# Patient Record
Sex: FEMALE | Race: WHITE | NOT HISPANIC OR LATINO | URBAN - METROPOLITAN AREA
[De-identification: names, ages, dates, MRNs, and addresses within clinical notes are randomized per-mention and may not be internally consistent; named-entity substitution may affect disease eponyms.]

---

## 2023-05-29 LAB — SARS-COV-2 RESULT: NOT DETECTED

## 2024-02-06 ENCOUNTER — OFFICE VISIT (OUTPATIENT)
Dept: URGENT CARE | Facility: CLINIC | Age: 49
End: 2024-02-06
Payer: COMMERCIAL

## 2024-02-06 VITALS
TEMPERATURE: 97.3 F | OXYGEN SATURATION: 92 % | DIASTOLIC BLOOD PRESSURE: 84 MMHG | SYSTOLIC BLOOD PRESSURE: 122 MMHG | RESPIRATION RATE: 18 BRPM | HEART RATE: 99 BPM

## 2024-02-06 DIAGNOSIS — J44.1 COPD WITH ACUTE EXACERBATION (MULTI): Primary | ICD-10-CM

## 2024-02-06 PROCEDURE — 99214 OFFICE O/P EST MOD 30 MIN: CPT | Performed by: PHYSICIAN ASSISTANT

## 2024-02-06 RX ORDER — ALBUTEROL SULFATE 90 UG/1
2 AEROSOL, METERED RESPIRATORY (INHALATION) EVERY 6 HOURS PRN
Qty: 18 G | Refills: 0 | Status: SHIPPED | OUTPATIENT
Start: 2024-02-06 | End: 2025-02-05

## 2024-02-06 RX ORDER — DOXYCYCLINE 100 MG/1
100 CAPSULE ORAL 2 TIMES DAILY
Qty: 20 CAPSULE | Refills: 0 | Status: SHIPPED | OUTPATIENT
Start: 2024-02-06 | End: 2024-02-16

## 2024-02-06 RX ORDER — ALBUTEROL SULFATE 0.83 MG/ML
2.5 SOLUTION RESPIRATORY (INHALATION) EVERY 6 HOURS PRN
COMMUNITY
Start: 2021-12-27

## 2024-02-06 RX ORDER — ALBUTEROL SULFATE 90 UG/1
AEROSOL, METERED RESPIRATORY (INHALATION)
COMMUNITY

## 2024-02-06 RX ORDER — PREDNISONE 20 MG/1
40 TABLET ORAL DAILY
Qty: 10 TABLET | Refills: 0 | Status: SHIPPED | OUTPATIENT
Start: 2024-02-06 | End: 2024-02-11

## 2024-02-06 RX ORDER — ALBUTEROL SULFATE 0.83 MG/ML
2.5 SOLUTION RESPIRATORY (INHALATION) EVERY 4 HOURS PRN
Qty: 75 ML | Refills: 0 | Status: SHIPPED | OUTPATIENT
Start: 2024-02-06 | End: 2025-02-05

## 2024-02-06 ASSESSMENT — ENCOUNTER SYMPTOMS
COUGH: 1
ENDOCRINE NEGATIVE: 1
SORE THROAT: 0
SHORTNESS OF BREATH: 1
GASTROINTESTINAL NEGATIVE: 1
FEVER: 0
PSYCHIATRIC NEGATIVE: 1
ALLERGIC/IMMUNOLOGIC NEGATIVE: 1
NEUROLOGICAL NEGATIVE: 1
MUSCULOSKELETAL NEGATIVE: 1
HEMATOLOGIC/LYMPHATIC NEGATIVE: 1
EYES NEGATIVE: 1

## 2024-02-06 NOTE — PROGRESS NOTES
Subjective   Patient ID: Sylvia Downs is a 48 y.o. female.      History provided by:  Patient   used: No    Shortness of Breath  Associated symptoms: cough    Associated symptoms: no ear pain, no fever and no sore throat      This is a 48 yr old female here for respiratory sxs. Cough productive of yellow sputum, and dyspnea x 1.5 weeks. No fever, URI sxs, ear pain, sore throat or edema, hx of COPD, out of inhaler and nebulizer rx.    Review of Systems   Constitutional:  Negative for fever.   HENT:  Negative for congestion, ear pain and sore throat.    Eyes: Negative.    Respiratory:  Positive for cough and shortness of breath.    Cardiovascular:  Negative for leg swelling.   Gastrointestinal: Negative.    Endocrine: Negative.    Genitourinary: Negative.    Musculoskeletal: Negative.    Skin: Negative.    Allergic/Immunologic: Negative.    Neurological: Negative.    Hematological: Negative.    Psychiatric/Behavioral: Negative.     All other systems reviewed and are negative.  /84   Pulse 99   Temp 36.3 °C (97.3 °F)   Resp 18   SpO2 92%     Objective   Physical Exam  Vitals and nursing note reviewed.   Constitutional:       Appearance: Normal appearance.   HENT:      Head: Normocephalic and atraumatic.      Right Ear: Tympanic membrane and ear canal normal.      Left Ear: Tympanic membrane and ear canal normal.      Mouth/Throat:      Mouth: Mucous membranes are moist.      Pharynx: Oropharynx is clear.   Cardiovascular:      Rate and Rhythm: Normal rate and regular rhythm.   Pulmonary:      Effort: Pulmonary effort is normal.      Breath sounds: Wheezing and rhonchi present.   Musculoskeletal:      Cervical back: Neck supple.      Right lower leg: No edema.      Left lower leg: No edema.   Lymphadenopathy:      Cervical: No cervical adenopathy.   Skin:     General: Skin is warm and dry.   Neurological:      General: No focal deficit present.      Mental Status: She is alert and oriented  to person, place, and time.   Psychiatric:         Mood and Affect: Mood normal.         Behavior: Behavior normal.     Assessment:  COPD exacerbation     Plan:  Doxycycline 100 mg bid x 10 days  Prednisone 40 mg daily x 5 days  Refill albuterol MDI and albuterol neb soln as directed  Increase clear fluids  Pcp follow up this week if not improving or worsening  ER visit anytime 24/7 for acute worsening or changing condition

## 2024-02-06 NOTE — PATIENT INSTRUCTIONS
Pcp follow up this week if not improving or worsening  ER visit anytime 24/7 for acute worsening or changing condition

## 2024-04-19 ENCOUNTER — HOSPITAL ENCOUNTER (OUTPATIENT)
Dept: RADIOLOGY | Facility: CLINIC | Age: 49
Discharge: HOME | End: 2024-04-19
Payer: COMMERCIAL

## 2024-04-19 ENCOUNTER — OFFICE VISIT (OUTPATIENT)
Dept: URGENT CARE | Facility: CLINIC | Age: 49
End: 2024-04-19
Payer: COMMERCIAL

## 2024-04-19 VITALS
RESPIRATION RATE: 22 BRPM | OXYGEN SATURATION: 95 % | TEMPERATURE: 98 F | SYSTOLIC BLOOD PRESSURE: 133 MMHG | DIASTOLIC BLOOD PRESSURE: 89 MMHG | HEART RATE: 109 BPM

## 2024-04-19 DIAGNOSIS — J44.1 COPD EXACERBATION (MULTI): ICD-10-CM

## 2024-04-19 DIAGNOSIS — J44.1 COPD EXACERBATION (MULTI): Primary | ICD-10-CM

## 2024-04-19 LAB
POC TRIPLEX FLU A-AG: NORMAL
POC TRIPLEX FLU B-AG: NORMAL
POC TRIPLEX SARSCOV-2 AG: NORMAL

## 2024-04-19 PROCEDURE — 87428 SARSCOV & INF VIR A&B AG IA: CPT | Performed by: PHYSICIAN ASSISTANT

## 2024-04-19 PROCEDURE — 94640 AIRWAY INHALATION TREATMENT: CPT | Performed by: PHYSICIAN ASSISTANT

## 2024-04-19 PROCEDURE — 71046 X-RAY EXAM CHEST 2 VIEWS: CPT | Performed by: RADIOLOGY

## 2024-04-19 PROCEDURE — 71046 X-RAY EXAM CHEST 2 VIEWS: CPT

## 2024-04-19 PROCEDURE — 99214 OFFICE O/P EST MOD 30 MIN: CPT | Performed by: PHYSICIAN ASSISTANT

## 2024-04-19 RX ORDER — AZITHROMYCIN 250 MG/1
TABLET, FILM COATED ORAL
Qty: 6 TABLET | Refills: 0 | Status: SHIPPED | OUTPATIENT
Start: 2024-04-19 | End: 2024-04-24

## 2024-04-19 RX ORDER — IPRATROPIUM BROMIDE AND ALBUTEROL SULFATE 2.5; .5 MG/3ML; MG/3ML
3 SOLUTION RESPIRATORY (INHALATION) ONCE
Status: COMPLETED | OUTPATIENT
Start: 2024-04-19 | End: 2024-04-19

## 2024-04-19 RX ORDER — PREDNISONE 10 MG/1
50 TABLET ORAL DAILY
Qty: 25 TABLET | Refills: 0 | Status: SHIPPED | OUTPATIENT
Start: 2024-04-19 | End: 2024-04-24

## 2024-04-19 RX ORDER — FLUTICASONE PROPIONATE 110 UG/1
1 AEROSOL, METERED RESPIRATORY (INHALATION)
Qty: 12 G | Refills: 0 | Status: SHIPPED | OUTPATIENT
Start: 2024-04-19 | End: 2025-04-19

## 2024-04-19 RX ADMIN — IPRATROPIUM BROMIDE AND ALBUTEROL SULFATE 3 ML: 2.5; .5 SOLUTION RESPIRATORY (INHALATION) at 11:10

## 2024-04-19 ASSESSMENT — PAIN SCALES - GENERAL: PAINLEVEL: 7

## 2024-04-19 NOTE — PROGRESS NOTES
Subjective   Patient ID: Sylvia Downs is a 49 y.o. female who presents for Breathing Problem.  Patient with a known underlying medical history of COPD with relatively frequent exacerbations.  She is here today with complaints of onset of cough sore throat chest pain with breathing and shortness of breath since yesterday.  Notes that she did use her albuterol inhaler but this did not seem to help.  She denies any fevers or chills denies any nausea vomiting diarrhea or abdominal pain chest pain outside of the deep breathing and coughing    Past Medical History:   Diagnosis Date    Personal history of other benign neoplasm 11/12/2020    History of other benign neoplasm    Personal history of other mental and behavioral disorders 11/12/2020    History of attention deficit hyperactivity disorder (ADHD)       The remainder of the systems were reviewed and are negative unless noted above      Objective   /89 (BP Location: Right arm, Patient Position: Sitting, BP Cuff Size: Adult)   Pulse 109   Temp 36.7 °C (98 °F)   Resp 22   SpO2 95%   Physical Exam  Constitutional:       General: She is not in acute distress.     Appearance: Normal appearance. She is not ill-appearing, toxic-appearing or diaphoretic.   HENT:      Head: Normocephalic and atraumatic.      Mouth/Throat:      Mouth: Mucous membranes are moist.      Pharynx: Oropharynx is clear. No posterior oropharyngeal erythema.   Eyes:      Conjunctiva/sclera: Conjunctivae normal.   Cardiovascular:      Rate and Rhythm: Normal rate and regular rhythm.      Heart sounds: No murmur heard.  Pulmonary:      Effort: Pulmonary effort is normal. No respiratory distress.      Breath sounds: Wheezing present. No rhonchi.   Musculoskeletal:         General: No swelling, tenderness, deformity or signs of injury. Normal range of motion.      Cervical back: Normal range of motion and neck supple.   Skin:     General: Skin is warm and dry.      Findings: No erythema or rash.    Neurological:      General: No focal deficit present.      Mental Status: She is alert and oriented to person, place, and time.      Gait: Gait normal.         Assessment/Plan   Problem List Items Addressed This Visit       COPD exacerbation (Multi) - Primary    Relevant Medications    ipratropium-albuteroL (Duo-Neb) 0.5-2.5 mg/3 mL nebulizer solution 3 mL (Completed)    predniSONE (Deltasone) tablet 60 mg    predniSONE (Deltasone) 10 mg tablet    azithromycin (Zithromax Z-Chuck) 250 mg tablet    fluticasone (Flovent HFA) 110 mcg/actuation inhaler    Other Relevant Orders    POCT BD Veritor Triplex Ag (Completed)    XR chest 2 views      Patient here with a known underlying history of COPD with apparent COPD with exacerbation.  Last exacerbation occurred roughly a month ago based upon chart review.  Patient notes that she is not prescribed an inhaled corticosteroid.  I am starting her on medicine recommending follow-up with primary care.  Also sending azithromycin for coverage of atypical respiratory bacteria given her risk factors.  Chest x-ray negative by my reading still awaiting final radiological review I discussed this with the patient and we will call her if there is any changes necessary to the treatment plan.  Otherwise patient to continue using her albuterol inhaler every 4-6 hours as needed.  Recommending follow-up with primary care here in the next week

## 2024-04-19 NOTE — PATIENT INSTRUCTIONS
Assessment/Plan   Problem List Items Addressed This Visit       COPD exacerbation (Multi) - Primary    Relevant Medications    ipratropium-albuteroL (Duo-Neb) 0.5-2.5 mg/3 mL nebulizer solution 3 mL (Completed)    predniSONE (Deltasone) tablet 60 mg    predniSONE (Deltasone) 10 mg tablet    azithromycin (Zithromax Z-Chuck) 250 mg tablet    fluticasone (Flovent HFA) 110 mcg/actuation inhaler    Other Relevant Orders    POCT BD Veritor Triplex Ag (Completed)    XR chest 2 views      Patient here with a known underlying history of COPD with apparent COPD with exacerbation.  Last exacerbation occurred roughly a month ago based upon chart review.  Patient notes that she is not prescribed an inhaled corticosteroid.  I am starting her on medicine recommending follow-up with primary care.  Also sending azithromycin for coverage of atypical respiratory bacteria given her risk factors.  Chest x-ray negative by my reading still awaiting final radiological review I discussed this with the patient and we will call her if there is any changes necessary to the treatment plan.  Otherwise patient to continue using her albuterol inhaler every 4-6 hours as needed.  Recommending follow-up with primary care here in the next week

## 2024-07-06 ENCOUNTER — OFFICE VISIT (OUTPATIENT)
Dept: URGENT CARE | Facility: CLINIC | Age: 49
End: 2024-07-06
Payer: COMMERCIAL

## 2024-07-06 ENCOUNTER — HOSPITAL ENCOUNTER (OUTPATIENT)
Dept: RADIOLOGY | Facility: CLINIC | Age: 49
Discharge: HOME | End: 2024-07-06
Payer: COMMERCIAL

## 2024-07-06 VITALS
SYSTOLIC BLOOD PRESSURE: 137 MMHG | DIASTOLIC BLOOD PRESSURE: 80 MMHG | OXYGEN SATURATION: 91 % | HEART RATE: 85 BPM | TEMPERATURE: 97.5 F | BODY MASS INDEX: 24.45 KG/M2 | WEIGHT: 138 LBS | RESPIRATION RATE: 24 BRPM

## 2024-07-06 DIAGNOSIS — J44.1 COPD EXACERBATION (MULTI): Primary | ICD-10-CM

## 2024-07-06 DIAGNOSIS — J44.1 COPD EXACERBATION (MULTI): ICD-10-CM

## 2024-07-06 PROCEDURE — 71046 X-RAY EXAM CHEST 2 VIEWS: CPT | Performed by: RADIOLOGY

## 2024-07-06 PROCEDURE — 71046 X-RAY EXAM CHEST 2 VIEWS: CPT

## 2024-07-06 RX ORDER — PREDNISONE 10 MG/1
50 TABLET ORAL DAILY
Qty: 20 TABLET | Refills: 0 | Status: SHIPPED | OUTPATIENT
Start: 2024-07-06 | End: 2024-07-10

## 2024-07-06 RX ORDER — ALBUTEROL SULFATE 90 UG/1
AEROSOL, METERED RESPIRATORY (INHALATION)
Qty: 18 G | Refills: 0 | Status: SHIPPED | OUTPATIENT
Start: 2024-07-06

## 2024-07-06 RX ORDER — BENZONATATE 200 MG/1
200 CAPSULE ORAL 3 TIMES DAILY PRN
Qty: 20 CAPSULE | Refills: 0 | Status: SHIPPED | OUTPATIENT
Start: 2024-07-06 | End: 2024-07-13

## 2024-07-06 RX ORDER — AZITHROMYCIN 250 MG/1
TABLET, FILM COATED ORAL
Qty: 6 TABLET | Refills: 0 | Status: SHIPPED | OUTPATIENT
Start: 2024-07-06 | End: 2024-07-11

## 2024-07-06 RX ORDER — IPRATROPIUM BROMIDE AND ALBUTEROL SULFATE 2.5; .5 MG/3ML; MG/3ML
3 SOLUTION RESPIRATORY (INHALATION) ONCE
Status: COMPLETED | OUTPATIENT
Start: 2024-07-06 | End: 2024-07-06

## 2024-07-06 NOTE — PATIENT INSTRUCTIONS
Assessment/Plan   Problem List Items Addressed This Visit       COPD exacerbation (Multi) - Primary    Relevant Medications    ipratropium-albuteroL (Duo-Neb) 0.5-2.5 mg/3 mL nebulizer solution 3 mL (Completed)    predniSONE (Deltasone) tablet 60 mg (Completed)    predniSONE (Deltasone) 10 mg tablet    azithromycin (Zithromax Z-Chuck) 250 mg tablet    benzonatate (Tessalon) 200 mg capsule    albuterol 90 mcg/actuation inhaler    Other Relevant Orders    XR chest 2 views (Completed)

## 2024-07-06 NOTE — LETTER
July 6, 2024     Patient: Sylvia Downs   YOB: 1975   Date of Visit: 7/6/2024       To Whom It May Concern:    It is my medical opinion that Sylvia Downs may return to work on 07/08/2024 .    If you have any questions or concerns, please don't hesitate to call.         Sincerely,        Trevon Wynn PA-C    CC: No Recipients

## 2024-07-06 NOTE — PROGRESS NOTES
Subjective   Patient ID: Sylvia Downs is a 49 y.o. female who presents for COPD.  Patient is here with likely COPD exacerbation reportedly.  The patient has an underlying history of COPD.  She continues to smoke a pack to a pack and 1/2/day.  Denies any fevers or chills.  She does note shortness of breath and wheezing.  She is tachypneic and satting 91% on room air.  No tripoding or stridor.  The patient notes cough is persistent, worse at night, productive intermittently.  She denies any nausea vomiting diarrhea or abdominal pain or chest pain.  She did use albuterol nebulizer prior to arrival and continues to use Flovent daily    Past Medical History:   Diagnosis Date    Personal history of other benign neoplasm 11/12/2020    History of other benign neoplasm    Personal history of other mental and behavioral disorders 11/12/2020    History of attention deficit hyperactivity disorder (ADHD)         The remainder of the systems were reviewed and are negative unless noted above      Objective   /80   Pulse 85   Temp 36.4 °C (97.5 °F)   Resp 24   Wt 62.6 kg (138 lb)   SpO2 91%   BMI 24.45 kg/m²   Physical Exam  Constitutional:       General: She is not in acute distress.     Appearance: Normal appearance. She is not ill-appearing, toxic-appearing or diaphoretic.   HENT:      Head: Normocephalic and atraumatic.      Mouth/Throat:      Mouth: Mucous membranes are moist.      Pharynx: Oropharynx is clear.   Eyes:      Conjunctiva/sclera: Conjunctivae normal.   Cardiovascular:      Rate and Rhythm: Normal rate and regular rhythm.      Heart sounds: No murmur heard.  Pulmonary:      Effort: Pulmonary effort is normal. No respiratory distress.      Breath sounds: Wheezing and rhonchi present. No rales.   Musculoskeletal:         General: No swelling, tenderness, deformity or signs of injury. Normal range of motion.      Cervical back: Normal range of motion and neck supple.      Right lower leg: No edema.       Left lower leg: No edema.   Skin:     General: Skin is warm and dry.      Findings: No erythema or rash.   Neurological:      General: No focal deficit present.      Mental Status: She is alert and oriented to person, place, and time.      Gait: Gait normal.         Assessment/Plan   Problem List Items Addressed This Visit       COPD exacerbation (Multi) - Primary    Relevant Medications    ipratropium-albuteroL (Duo-Neb) 0.5-2.5 mg/3 mL nebulizer solution 3 mL (Completed)    predniSONE (Deltasone) tablet 60 mg (Completed)    predniSONE (Deltasone) 10 mg tablet    azithromycin (Zithromax Z-Chuck) 250 mg tablet    benzonatate (Tessalon) 200 mg capsule    albuterol 90 mcg/actuation inhaler    Other Relevant Orders    XR chest 2 views (Completed)      Patient with a significant history of underlying COPD.  Treated with DuoNeb and prednisone here in office noting considerable improvement in ease of breathing.  Lung auscultation improved though she still has some expiratory wheezing.  Radiographs are obtained which show no evidence of focal pneumonia.  Given her increased risk of atypical bacterial lower respiratory tract infection given her smoking history I have opted to start the patient on azithromycin as well as continued prednisone over the next 4 days and refilled the patient's albuterol inhaler and provided her with prescription of Tessalon Perles to help with the cough.  Patient also requesting work note and this has been provided for

## 2025-01-08 ENCOUNTER — PATIENT OUTREACH (OUTPATIENT)
Dept: CARE COORDINATION | Facility: CLINIC | Age: 50
End: 2025-01-08
Payer: COMMERCIAL

## 2025-02-06 ENCOUNTER — PATIENT OUTREACH (OUTPATIENT)
Dept: CARE COORDINATION | Facility: CLINIC | Age: 50
End: 2025-02-06
Payer: COMMERCIAL

## 2025-02-06 NOTE — PROGRESS NOTES
Second attempt made to reach patient for transitions of care outreach call -no contact made with patient.

## 2025-02-25 ENCOUNTER — PATIENT OUTREACH (OUTPATIENT)
Dept: CARE COORDINATION | Facility: CLINIC | Age: 50
End: 2025-02-25
Payer: COMMERCIAL